# Patient Record
Sex: MALE | URBAN - METROPOLITAN AREA
[De-identification: names, ages, dates, MRNs, and addresses within clinical notes are randomized per-mention and may not be internally consistent; named-entity substitution may affect disease eponyms.]

---

## 2017-07-19 ENCOUNTER — IMPORTED ENCOUNTER (OUTPATIENT)
Dept: URBAN - METROPOLITAN AREA CLINIC 1 | Facility: CLINIC | Age: 72
End: 2017-07-19

## 2017-07-19 PROBLEM — H40.013: Noted: 2017-07-19

## 2017-07-19 PROBLEM — H25.813: Noted: 2017-07-19

## 2017-07-19 PROBLEM — E11.9: Noted: 2017-07-19

## 2017-07-19 PROBLEM — H04.123: Noted: 2017-07-19

## 2017-07-19 PROBLEM — Z79.84: Noted: 2017-07-19

## 2017-07-19 PROBLEM — H01.004: Noted: 2017-07-19

## 2017-07-19 PROBLEM — H01.001: Noted: 2017-07-19

## 2017-07-19 PROCEDURE — 92015 DETERMINE REFRACTIVE STATE: CPT

## 2017-07-19 PROCEDURE — 92250 FUNDUS PHOTOGRAPHY W/I&R: CPT

## 2017-07-19 PROCEDURE — 92014 COMPRE OPH EXAM EST PT 1/>: CPT

## 2017-07-19 NOTE — PATIENT DISCUSSION
1.  DM Type II without sign of diabetic retinopathy and no blot heme on dilated retinal examination today OU No Macular Edema:  Discussed the pathophysiology of diabetes and its effect on the eye and risk of blindness. Stressed the importance of strong glucose control. Advised of importance of at least yearly dilated examinations but to contact us immediately for any problems or concerns. 2. Type II diabetes controlled by oral medications. 3.  Cataract OU: Observe for now without intervention. The patient was advised to contact us if any change or worsening of vision4. COAG Suspect OU: (0.75/0.75)  IOP was 16 OU. Condition was discussed with patient. Will monitor patient for progression. Fundus photo was done today 5. Dry Eyes OU -- Recommended to patient to use Artificial Tears BID OU-Sample of Systane Balance given 6. Blepharitis anterior type OU - Daily warm compresses and lid scrubs were recommended. 7. Chalazion OS-resolving REC warm comps 3-5 mins per day BID 8. Return for an appointment in 1 month for 10 OCT and VF 24-2 with Dr. Xenia Zavala.

## 2019-07-05 ENCOUNTER — IMPORTED ENCOUNTER (OUTPATIENT)
Dept: URBAN - METROPOLITAN AREA CLINIC 1 | Facility: CLINIC | Age: 74
End: 2019-07-05

## 2019-07-05 PROBLEM — H16.143: Noted: 2019-07-05

## 2019-07-05 PROBLEM — H40.023: Noted: 2019-07-05

## 2019-07-05 PROBLEM — E11.9: Noted: 2019-07-05

## 2019-07-05 PROBLEM — H01.004: Noted: 2019-07-05

## 2019-07-05 PROBLEM — Z79.84: Noted: 2019-07-05

## 2019-07-05 PROBLEM — H25.813: Noted: 2019-07-05

## 2019-07-05 PROBLEM — H01.001: Noted: 2019-07-05

## 2019-07-05 PROBLEM — H04.123: Noted: 2019-07-05

## 2019-07-05 PROBLEM — H35.61: Noted: 2019-07-05

## 2019-07-05 PROCEDURE — 92014 COMPRE OPH EXAM EST PT 1/>: CPT

## 2019-07-05 PROCEDURE — 92015 DETERMINE REFRACTIVE STATE: CPT

## 2019-07-05 NOTE — PATIENT DISCUSSION
1.  DM Type II (Oral Meds) without sign of diabetic retinopathy and no blot heme on dilated retinal examination today OU No Macular Edema:  Discussed the pathophysiology of diabetes and its effect on the eye and risk of blindness. Stressed the importance of strong glucose control. Advised of importance of at least yearly dilated examinations but to contact us immediately for any problems or concerns. 2. Glaucoma Suspect OU (CD 0.75/0.8) Neg Fm Hx. Retinal Heme noted OD unrelated to DM. Risk of cupping. Baseline w/u after Phaco. 3.  Cataract OU:  Visually Significant secondary to glare discussed the risks benefits alternatives and limitations of cataract surgery. The patient stated a full understanding and a desire to proceed with the procedure. The patient will need to return for preop appointment with cataract measurements and to have any additional questions answered and start pre-operative eye drops as directed. Phaco PCL OD then OS (Pt R Handed but sites gun with OS) (Otherwise follow-up 3 mo 10 OCT/ VF 24-2 OU)4. 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 5.  AVERY w/ PEK OU- Cont ATs TID OU Routinely. 6.  Anterior Blepharitis OU - Cont Daily Hot compresses and lid scrubs were recommended. Letter to PCP Return for an appointment with Dr. Murali Nguyễn for AS/HP.

## 2019-07-29 ENCOUNTER — IMPORTED ENCOUNTER (OUTPATIENT)
Dept: URBAN - METROPOLITAN AREA CLINIC 1 | Facility: CLINIC | Age: 74
End: 2019-07-29

## 2019-07-29 PROBLEM — H25.811: Noted: 2019-07-29

## 2019-07-29 PROCEDURE — 92136 OPHTHALMIC BIOMETRY: CPT

## 2019-07-29 NOTE — PATIENT DISCUSSION
1. Cataract OD:  Visually Significant secondary to glare - discussed the risks benefits alternatives and limitations of cataract surgery. The patient stated a full understanding and a desire to proceed with the procedure. Pt understands they will need glasses post-op to achieve their best corrected vision. Phaco PCL OD (Toric lens) Return for an appointment for Return as scheduled with Dr. Yanick Harris.

## 2019-08-07 ENCOUNTER — IMPORTED ENCOUNTER (OUTPATIENT)
Dept: URBAN - METROPOLITAN AREA CLINIC 1 | Facility: CLINIC | Age: 74
End: 2019-08-07

## 2019-08-08 ENCOUNTER — IMPORTED ENCOUNTER (OUTPATIENT)
Dept: URBAN - METROPOLITAN AREA CLINIC 1 | Facility: CLINIC | Age: 74
End: 2019-08-08

## 2019-08-08 PROBLEM — Z96.1: Noted: 2019-08-08

## 2019-08-08 PROCEDURE — 99024 POSTOP FOLLOW-UP VISIT: CPT

## 2019-08-08 NOTE — PATIENT DISCUSSION
POD#1 CE/IOL OD (Toric) doing well. Use Prednisolone BID OD Ilvero Qdaily OD Ocuflox TID OD : Use all three gtts through completion of PO gtt chart regimen/ Per our instructions given to patient.   Post op Warnings Reiterated RTC as scheduled

## 2019-08-16 ENCOUNTER — IMPORTED ENCOUNTER (OUTPATIENT)
Dept: URBAN - METROPOLITAN AREA CLINIC 1 | Facility: CLINIC | Age: 74
End: 2019-08-16

## 2019-08-16 PROBLEM — H25.812: Noted: 2019-08-16

## 2019-08-16 PROCEDURE — 92136 OPHTHALMIC BIOMETRY: CPT

## 2019-08-16 NOTE — PATIENT DISCUSSION
1.  Cataract OS Visually Significant secondary to glare discussed the risks benefits alternatives and limitations of cataract surgery. The patient stated a full understanding and a desire to proceed with the procedure. Pt understands they will need glasses post-op to achieve their best corrected vision. Phaco PCL OS 2. POW#2  CE/IOL OD (Toric) doing well. Use Prednisolone BID OD & Ilvero Qdaily OD and use through completion of po gtt regimen. F/u as scheduled 2nd eye 3. New onset URI: Due to patient being scheduled for upcoming Phaco will begin po Zpak. Begin Z-Sterling po as directed.  F/u as scheduled Phaco OS

## 2019-08-21 ENCOUNTER — IMPORTED ENCOUNTER (OUTPATIENT)
Dept: URBAN - METROPOLITAN AREA CLINIC 1 | Facility: CLINIC | Age: 74
End: 2019-08-21

## 2019-08-22 ENCOUNTER — IMPORTED ENCOUNTER (OUTPATIENT)
Dept: URBAN - METROPOLITAN AREA CLINIC 1 | Facility: CLINIC | Age: 74
End: 2019-08-22

## 2019-08-22 PROBLEM — Z96.1: Noted: 2019-08-22

## 2019-08-22 PROCEDURE — 99024 POSTOP FOLLOW-UP VISIT: CPT

## 2019-08-22 NOTE — PATIENT DISCUSSION
1. POD#1 Phaco/ PCL OS (Toric) - doing well. Corneal Edema noted on today's examUse Prednisolone BID OD Ilvero Qdaily OD Ocuflox TID OD: Use all three gtts through completion of PO gtt chart regimen/ Per our instructions given. Post op Warnings Reiterated 2. POW#3 Phaco/ PCL OD (Toric) - doing well Use Prednisolone BID OD and Ilevro Qdaily OD: Use gtts through completion of PO gtt regimen.  RTC as scheduled

## 2019-09-12 ENCOUNTER — IMPORTED ENCOUNTER (OUTPATIENT)
Dept: URBAN - METROPOLITAN AREA CLINIC 1 | Facility: CLINIC | Age: 74
End: 2019-09-12

## 2019-09-12 PROBLEM — Z96.1: Noted: 2019-09-12

## 2019-09-12 PROCEDURE — 99024 POSTOP FOLLOW-UP VISIT: CPT

## 2019-09-12 NOTE — PATIENT DISCUSSION
POM#1 CE/IOL OU (Toric OU) doing well. Use Prednisolone BID OS and Ilevro Qdaily OS: Use gtts through completion of PO gtt regimen. MRX for glasses given. Return for an appointment in January 30/OCT with Dr. Samuel Andersen.

## 2022-04-02 ASSESSMENT — VISUAL ACUITY
OS_SC: 20/40
OS_SC: 20/40
OS_SC: 20/20-1
OD_CC: 20/40+2
OD_CC: 20/40+1
OD_GLARE: 20/100
OU_CC: J1+
OS_CC: CF@1'
OS_GLARE: 20/80
OS_GLARE: 20/80
OD_SC: 20/200
OD_CC: 20/60-2
OS_CC: 20/20-2
OS_CC: J1
OS_CC: 20/70
OS_GLARE: 20/100
OD_CC: 20/100
OD_GLARE: 20/400
OD_CC: 20/25-2
OD_CC: J1-2
OD_SC: 20/40-2

## 2022-04-02 ASSESSMENT — TONOMETRY
OS_IOP_MMHG: 15
OD_IOP_MMHG: 12
OD_IOP_MMHG: 16
OS_IOP_MMHG: 16
OD_IOP_MMHG: 13
OD_IOP_MMHG: 15
OD_IOP_MMHG: 10
OS_IOP_MMHG: 12
OS_IOP_MMHG: 15
OD_IOP_MMHG: 11